# Patient Record
Sex: FEMALE | Race: WHITE | Employment: OTHER | URBAN - METROPOLITAN AREA
[De-identification: names, ages, dates, MRNs, and addresses within clinical notes are randomized per-mention and may not be internally consistent; named-entity substitution may affect disease eponyms.]

---

## 2017-08-03 PROBLEM — G47.33 OSA (OBSTRUCTIVE SLEEP APNEA): Status: ACTIVE | Noted: 2017-08-03

## 2018-03-16 ENCOUNTER — APPOINTMENT (OUTPATIENT)
Dept: CT IMAGING | Age: 68
DRG: 312 | End: 2018-03-16
Payer: MEDICARE

## 2018-03-16 ENCOUNTER — HOSPITAL ENCOUNTER (INPATIENT)
Age: 68
LOS: 1 days | Discharge: HOME | DRG: 312 | End: 2018-03-17
Attending: EMERGENCY MEDICINE | Admitting: INTERNAL MEDICINE
Payer: MEDICARE

## 2018-03-16 PROBLEM — R55 SYNCOPE AND COLLAPSE: Status: ACTIVE | Noted: 2018-03-16

## 2018-03-16 LAB
ALBUMIN SERPL-MCNC: 3.7 G/DL (ref 3.5–5.2)
ALP BLD-CCNC: 21 U/L (ref 35–104)
ALT SERPL-CCNC: 12 U/L (ref 0–32)
ANION GAP SERPL CALCULATED.3IONS-SCNC: 12 MMOL/L (ref 7–16)
AST SERPL-CCNC: 19 U/L (ref 0–31)
BASOPHILS ABSOLUTE: 0.05 E9/L (ref 0–0.2)
BASOPHILS RELATIVE PERCENT: 0.6 % (ref 0–2)
BILIRUB SERPL-MCNC: 0.6 MG/DL (ref 0–1.2)
BUN BLDV-MCNC: 12 MG/DL (ref 8–23)
CALCIUM SERPL-MCNC: 9.2 MG/DL (ref 8.6–10.2)
CHLORIDE BLD-SCNC: 100 MMOL/L (ref 98–107)
CO2: 25 MMOL/L (ref 22–29)
CREAT SERPL-MCNC: 0.8 MG/DL (ref 0.5–1)
EOSINOPHILS ABSOLUTE: 0.1 E9/L (ref 0.05–0.5)
EOSINOPHILS RELATIVE PERCENT: 1.1 % (ref 0–6)
GFR AFRICAN AMERICAN: >60
GFR NON-AFRICAN AMERICAN: >60 ML/MIN/1.73
GLUCOSE BLD-MCNC: 150 MG/DL (ref 74–109)
HCT VFR BLD CALC: 38.1 % (ref 34–48)
HEMOGLOBIN: 12.2 G/DL (ref 11.5–15.5)
IMMATURE GRANULOCYTES #: 0.04 E9/L
IMMATURE GRANULOCYTES %: 0.5 % (ref 0–5)
LYMPHOCYTES ABSOLUTE: 1.29 E9/L (ref 1.5–4)
LYMPHOCYTES RELATIVE PERCENT: 14.6 % (ref 20–42)
MCH RBC QN AUTO: 25.8 PG (ref 26–35)
MCHC RBC AUTO-ENTMCNC: 32 % (ref 32–34.5)
MCV RBC AUTO: 80.5 FL (ref 80–99.9)
MONOCYTES ABSOLUTE: 0.64 E9/L (ref 0.1–0.95)
MONOCYTES RELATIVE PERCENT: 7.2 % (ref 2–12)
NEUTROPHILS ABSOLUTE: 6.74 E9/L (ref 1.8–7.3)
NEUTROPHILS RELATIVE PERCENT: 76 % (ref 43–80)
PDW BLD-RTO: 13.5 FL (ref 11.5–15)
PLATELET # BLD: 230 E9/L (ref 130–450)
PMV BLD AUTO: 10.5 FL (ref 7–12)
POTASSIUM SERPL-SCNC: 4.5 MMOL/L (ref 3.5–5)
RBC # BLD: 4.73 E12/L (ref 3.5–5.5)
SODIUM BLD-SCNC: 137 MMOL/L (ref 132–146)
TOTAL PROTEIN: 6.5 G/DL (ref 6.4–8.3)
TROPONIN: <0.01 NG/ML (ref 0–0.03)
WBC # BLD: 8.9 E9/L (ref 4.5–11.5)

## 2018-03-16 PROCEDURE — 70450 CT HEAD/BRAIN W/O DYE: CPT

## 2018-03-16 PROCEDURE — 72125 CT NECK SPINE W/O DYE: CPT

## 2018-03-16 PROCEDURE — 93005 ELECTROCARDIOGRAM TRACING: CPT | Performed by: PHYSICIAN ASSISTANT

## 2018-03-16 PROCEDURE — 6370000000 HC RX 637 (ALT 250 FOR IP): Performed by: INTERNAL MEDICINE

## 2018-03-16 PROCEDURE — 85025 COMPLETE CBC W/AUTO DIFF WBC: CPT

## 2018-03-16 PROCEDURE — 80053 COMPREHEN METABOLIC PANEL: CPT

## 2018-03-16 PROCEDURE — 6360000002 HC RX W HCPCS: Performed by: INTERNAL MEDICINE

## 2018-03-16 PROCEDURE — 99285 EMERGENCY DEPT VISIT HI MDM: CPT

## 2018-03-16 PROCEDURE — 36415 COLL VENOUS BLD VENIPUNCTURE: CPT

## 2018-03-16 PROCEDURE — 2060000000 HC ICU INTERMEDIATE R&B

## 2018-03-16 PROCEDURE — 84484 ASSAY OF TROPONIN QUANT: CPT

## 2018-03-16 RX ORDER — SODIUM CHLORIDE 0.9 % (FLUSH) 0.9 %
10 SYRINGE (ML) INJECTION EVERY 12 HOURS SCHEDULED
Status: DISCONTINUED | OUTPATIENT
Start: 2018-03-16 | End: 2018-03-16 | Stop reason: SDUPTHER

## 2018-03-16 RX ORDER — ZOLPIDEM TARTRATE 5 MG/1
5 TABLET ORAL NIGHTLY PRN
Status: DISCONTINUED | OUTPATIENT
Start: 2018-03-16 | End: 2018-03-17 | Stop reason: HOSPADM

## 2018-03-16 RX ORDER — ACETAMINOPHEN 325 MG/1
650 TABLET ORAL EVERY 4 HOURS PRN
Status: DISCONTINUED | OUTPATIENT
Start: 2018-03-16 | End: 2018-03-16 | Stop reason: SDUPTHER

## 2018-03-16 RX ORDER — CARVEDILOL 6.25 MG/1
6.25 TABLET ORAL 2 TIMES DAILY WITH MEALS
Status: DISCONTINUED | OUTPATIENT
Start: 2018-03-17 | End: 2018-03-17 | Stop reason: HOSPADM

## 2018-03-16 RX ORDER — LISINOPRIL 5 MG/1
5 TABLET ORAL DAILY
Status: DISCONTINUED | OUTPATIENT
Start: 2018-03-17 | End: 2018-03-17 | Stop reason: HOSPADM

## 2018-03-16 RX ORDER — ASPIRIN 81 MG/1
81 TABLET, CHEWABLE ORAL DAILY
Status: DISCONTINUED | OUTPATIENT
Start: 2018-03-17 | End: 2018-03-17 | Stop reason: HOSPADM

## 2018-03-16 RX ORDER — NITROGLYCERIN 0.4 MG/1
0.4 TABLET SUBLINGUAL EVERY 5 MIN PRN
Status: DISCONTINUED | OUTPATIENT
Start: 2018-03-16 | End: 2018-03-17 | Stop reason: HOSPADM

## 2018-03-16 RX ORDER — EXEMESTANE 25 MG/1
25 TABLET ORAL DAILY
Status: DISCONTINUED | OUTPATIENT
Start: 2018-03-17 | End: 2018-03-17 | Stop reason: HOSPADM

## 2018-03-16 RX ORDER — ONDANSETRON 2 MG/ML
4 INJECTION INTRAMUSCULAR; INTRAVENOUS EVERY 6 HOURS PRN
Status: DISCONTINUED | OUTPATIENT
Start: 2018-03-16 | End: 2018-03-16 | Stop reason: SDUPTHER

## 2018-03-16 RX ORDER — ACETAMINOPHEN 325 MG/1
650 TABLET ORAL EVERY 4 HOURS PRN
Status: DISCONTINUED | OUTPATIENT
Start: 2018-03-16 | End: 2018-03-17 | Stop reason: HOSPADM

## 2018-03-16 RX ORDER — ATORVASTATIN CALCIUM 20 MG/1
20 TABLET, FILM COATED ORAL NIGHTLY
Status: DISCONTINUED | OUTPATIENT
Start: 2018-03-16 | End: 2018-03-17 | Stop reason: HOSPADM

## 2018-03-16 RX ORDER — SODIUM CHLORIDE 0.9 % (FLUSH) 0.9 %
10 SYRINGE (ML) INJECTION EVERY 12 HOURS SCHEDULED
Status: DISCONTINUED | OUTPATIENT
Start: 2018-03-16 | End: 2018-03-17 | Stop reason: HOSPADM

## 2018-03-16 RX ORDER — SODIUM CHLORIDE 0.9 % (FLUSH) 0.9 %
10 SYRINGE (ML) INJECTION PRN
Status: DISCONTINUED | OUTPATIENT
Start: 2018-03-16 | End: 2018-03-17 | Stop reason: HOSPADM

## 2018-03-16 RX ORDER — ONDANSETRON 2 MG/ML
4 INJECTION INTRAMUSCULAR; INTRAVENOUS EVERY 6 HOURS PRN
Status: DISCONTINUED | OUTPATIENT
Start: 2018-03-16 | End: 2018-03-17 | Stop reason: HOSPADM

## 2018-03-16 RX ORDER — SODIUM CHLORIDE 0.9 % (FLUSH) 0.9 %
10 SYRINGE (ML) INJECTION PRN
Status: DISCONTINUED | OUTPATIENT
Start: 2018-03-16 | End: 2018-03-16 | Stop reason: SDUPTHER

## 2018-03-16 RX ADMIN — ENOXAPARIN SODIUM 40 MG: 40 INJECTION SUBCUTANEOUS at 16:49

## 2018-03-16 RX ADMIN — ATORVASTATIN CALCIUM 20 MG: 20 TABLET, FILM COATED ORAL at 22:30

## 2018-03-16 RX ADMIN — ZOLPIDEM TARTRATE 5 MG: 5 TABLET ORAL at 22:30

## 2018-03-16 ASSESSMENT — PAIN SCALES - GENERAL
PAINLEVEL_OUTOF10: 0
PAINLEVEL_OUTOF10: 1

## 2018-03-16 ASSESSMENT — PAIN DESCRIPTION - DESCRIPTORS: DESCRIPTORS: ACHING

## 2018-03-16 ASSESSMENT — PAIN DESCRIPTION - LOCATION: LOCATION: HEAD

## 2018-03-16 ASSESSMENT — PAIN DESCRIPTION - PROGRESSION: CLINICAL_PROGRESSION: GRADUALLY WORSENING

## 2018-03-16 ASSESSMENT — PAIN DESCRIPTION - FREQUENCY: FREQUENCY: INTERMITTENT

## 2018-03-16 ASSESSMENT — PAIN DESCRIPTION - PAIN TYPE: TYPE: ACUTE PAIN

## 2018-03-16 NOTE — ED PROVIDER NOTES
her father and mother; Heart Attack in her father; Heart Disease in her father; Heart Surgery in her sister; High Blood Pressure in her mother; Stroke in her mother. The patients home medications have been reviewed. Allergies: Patient has no known allergies. ---------------------------------------------------PHYSICAL EXAM--------------------------------------    Constitutional/General: Alert and oriented x3, well appearing, non toxic in NAD  Head: Normocephalic, small laceration on occipital area  Eyes: PERRL, EOMI, conjunctive normal, sclera non icteric  Mouth: Oropharynx clear, handling secretions, no trismus, no asymmetry of the posterior oropharynx or uvular edema  Neck: Supple, full ROM, non tender to palpation in the midline, no stridor, no crepitus, no meningeal signs  Respiratory: Lungs clear to auscultation bilaterally, no wheezes, rales, or rhonchi. Not in respiratory distress  Cardiovascular:  Regular rate. Regular rhythm. No murmurs, gallops, or rubs. 2+ distal pulses  Chest: No chest wall tenderness  GI:  Abdomen Soft, Non tender, Non distended. +BS. No organomegaly, no palpable masses,  No rebound, guarding, or rigidity. Musculoskeletal: Moves all extremities x 4. Warm and well perfused, no clubbing, cyanosis, or edema. Capillary refill <3 seconds  Integument: skin warm and dry. No rashes. Lymphatic: no lymphadenopathy noted  Neurologic: GCS 15, no focal deficits, symmetric strength 5/5 in the upper and lower extremities bilaterally, no cerebellar signs, negative Romberg's   Psychiatric: Normal Affect    -------------------------------------------------- RESULTS -------------------------------------------------  I have personally reviewed all laboratory and imaging results for this patient. Results are listed below.      LABS:  Results for orders placed or performed during the hospital encounter of 03/16/18   CBC Auto Differential   Result Value Ref Range    WBC 8.9 4.5 - 11.5 E9/L RBC 4.73 3.50 - 5.50 E12/L    Hemoglobin 12.2 11.5 - 15.5 g/dL    Hematocrit 38.1 34.0 - 48.0 %    MCV 80.5 80.0 - 99.9 fL    MCH 25.8 (L) 26.0 - 35.0 pg    MCHC 32.0 32.0 - 34.5 %    RDW 13.5 11.5 - 15.0 fL    Platelets 726 886 - 880 E9/L    MPV 10.5 7.0 - 12.0 fL    Neutrophils % 76.0 43.0 - 80.0 %    Immature Granulocytes % 0.5 0.0 - 5.0 %    Lymphocytes % 14.6 (L) 20.0 - 42.0 %    Monocytes % 7.2 2.0 - 12.0 %    Eosinophils % 1.1 0.0 - 6.0 %    Basophils % 0.6 0.0 - 2.0 %    Neutrophils # 6.74 1.80 - 7.30 E9/L    Immature Granulocytes # 0.04 E9/L    Lymphocytes # 1.29 (L) 1.50 - 4.00 E9/L    Monocytes # 0.64 0.10 - 0.95 E9/L    Eosinophils # 0.10 0.05 - 0.50 E9/L    Basophils # 0.05 0.00 - 0.20 E9/L   Comprehensive Metabolic Panel   Result Value Ref Range    Sodium 137 132 - 146 mmol/L    Potassium 4.5 3.5 - 5.0 mmol/L    Chloride 100 98 - 107 mmol/L    CO2 25 22 - 29 mmol/L    Anion Gap 12 7 - 16 mmol/L    Glucose 150 (H) 74 - 109 mg/dL    BUN 12 8 - 23 mg/dL    CREATININE 0.8 0.5 - 1.0 mg/dL    GFR Non-African American >60 >=60 mL/min/1.73    GFR African American >60     Calcium 9.2 8.6 - 10.2 mg/dL    Total Protein 6.5 6.4 - 8.3 g/dL    Alb 3.7 3.5 - 5.2 g/dL    Total Bilirubin 0.6 0.0 - 1.2 mg/dL    Alkaline Phosphatase 21 (L) 35 - 104 U/L    ALT 12 0 - 32 U/L    AST 19 0 - 31 U/L   Troponin   Result Value Ref Range    Troponin <0.01 0.00 - 0.03 ng/mL       RADIOLOGY:  Interpreted by Radiologist.  CT Head WO Contrast    (Results Pending)   CT Cervical Spine WO Contrast    (Results Pending)       EKG: This EKG is signed and interpreted by the EP. Time: 13:29  Rate: 64  Rhythm: Sinus  Interpretation: non-specific EKG  Comparison: stable as compared to patient's most recent EKG and None      ------------------------- NURSING NOTES AND VITALS REVIEWED ---------------------------   The nursing notes within the ED encounter and vital signs as below have been reviewed by myself.   BP (!) 155/70   Pulse 70

## 2018-03-16 NOTE — ED NOTES
Chacorta Soto on the floor states he can see the pt on the monitor     Margrett Hamman, RN  03/16/18 1800

## 2018-03-16 NOTE — ED NOTES
FIRST  PROVIDER CONTACT ASSESSMENT NOTE      Department of Emergency Medicine   3/16/18  1:23 PM    Chief Complaint: Loss of Consciousness (states she passed out and hit head on a chair fell from standing )      History of Present Illness:    Virginia Goldstein is a 74 y.o. female who presents to the ED by private car for LOC that occurred at 11am.  fell and hit her head on a chair. States he took all of her medications and did not eat. States feels improved now. Focused Screening Exam:  Constitutional:  Alert, appears stated age and is in no distress. Neuro: No focal deficits  Head: 1.0 cm scalp laceration. *ALLERGIES*     Patient has no known allergies.      ED Triage Vitals   BP Temp Temp src Pulse Resp SpO2 Height Weight   -- -- -- -- -- -- -- --        Initial Plan of Care:  Initiate Treatment-Testing, Proceed toTreatment Area When Bed Available for ED Attending/MLP to Continue Care    -----------------END OF FIRST PROVIDER CONTACT ASSESSMENT NOTE--------------  Electronically signed by KIMBERLEY Hernandez   DD: 3/16/18       KIMBERLEY Hernandez  03/16/18 [id number]

## 2018-03-17 VITALS
BODY MASS INDEX: 29.26 KG/M2 | RESPIRATION RATE: 16 BRPM | TEMPERATURE: 98.3 F | HEIGHT: 62 IN | SYSTOLIC BLOOD PRESSURE: 132 MMHG | OXYGEN SATURATION: 98 % | HEART RATE: 72 BPM | WEIGHT: 159 LBS | DIASTOLIC BLOOD PRESSURE: 76 MMHG

## 2018-03-17 LAB
ANION GAP SERPL CALCULATED.3IONS-SCNC: 16 MMOL/L (ref 7–16)
BASOPHILS ABSOLUTE: 0.04 E9/L (ref 0–0.2)
BASOPHILS RELATIVE PERCENT: 0.5 % (ref 0–2)
BUN BLDV-MCNC: 14 MG/DL (ref 8–23)
CALCIUM SERPL-MCNC: 8.9 MG/DL (ref 8.6–10.2)
CHLORIDE BLD-SCNC: 105 MMOL/L (ref 98–107)
CO2: 22 MMOL/L (ref 22–29)
CREAT SERPL-MCNC: 0.9 MG/DL (ref 0.5–1)
EOSINOPHILS ABSOLUTE: 0.15 E9/L (ref 0.05–0.5)
EOSINOPHILS RELATIVE PERCENT: 2 % (ref 0–6)
GFR AFRICAN AMERICAN: >60
GFR NON-AFRICAN AMERICAN: >60 ML/MIN/1.73
GLUCOSE BLD-MCNC: 101 MG/DL (ref 74–109)
HCT VFR BLD CALC: 35 % (ref 34–48)
HEMOGLOBIN: 11.4 G/DL (ref 11.5–15.5)
IMMATURE GRANULOCYTES #: 0.02 E9/L
IMMATURE GRANULOCYTES %: 0.3 % (ref 0–5)
LYMPHOCYTES ABSOLUTE: 1.88 E9/L (ref 1.5–4)
LYMPHOCYTES RELATIVE PERCENT: 25.3 % (ref 20–42)
MAGNESIUM: 2 MG/DL (ref 1.6–2.6)
MCH RBC QN AUTO: 25.6 PG (ref 26–35)
MCHC RBC AUTO-ENTMCNC: 32.6 % (ref 32–34.5)
MCV RBC AUTO: 78.7 FL (ref 80–99.9)
MONOCYTES ABSOLUTE: 0.89 E9/L (ref 0.1–0.95)
MONOCYTES RELATIVE PERCENT: 12 % (ref 2–12)
NEUTROPHILS ABSOLUTE: 4.44 E9/L (ref 1.8–7.3)
NEUTROPHILS RELATIVE PERCENT: 59.9 % (ref 43–80)
PDW BLD-RTO: 13.6 FL (ref 11.5–15)
PLATELET # BLD: 213 E9/L (ref 130–450)
PMV BLD AUTO: 10.7 FL (ref 7–12)
POTASSIUM REFLEX MAGNESIUM: 3.5 MMOL/L (ref 3.5–5)
RBC # BLD: 4.45 E12/L (ref 3.5–5.5)
SODIUM BLD-SCNC: 143 MMOL/L (ref 132–146)
WBC # BLD: 7.4 E9/L (ref 4.5–11.5)

## 2018-03-17 PROCEDURE — 85025 COMPLETE CBC W/AUTO DIFF WBC: CPT

## 2018-03-17 PROCEDURE — 6370000000 HC RX 637 (ALT 250 FOR IP): Performed by: INTERNAL MEDICINE

## 2018-03-17 PROCEDURE — 2580000003 HC RX 258

## 2018-03-17 PROCEDURE — 80048 BASIC METABOLIC PNL TOTAL CA: CPT

## 2018-03-17 PROCEDURE — 36415 COLL VENOUS BLD VENIPUNCTURE: CPT

## 2018-03-17 PROCEDURE — 83735 ASSAY OF MAGNESIUM: CPT

## 2018-03-17 RX ADMIN — CARVEDILOL 6.25 MG: 6.25 TABLET, FILM COATED ORAL at 09:07

## 2018-03-17 RX ADMIN — LISINOPRIL 5 MG: 5 TABLET ORAL at 09:07

## 2018-03-17 RX ADMIN — ASPIRIN 81 MG 81 MG: 81 TABLET ORAL at 09:07

## 2018-03-17 NOTE — H&P
sputum production,   Denies chest pain, pressure, orthopnea, palpitations,   Denies abd pain, N/V/D/C/melena, hematochezia,   Denies urinary frequency, urgency, dysuria, hematuria,   Denies any acute muscle aches, paresthesias, weakness, seizure,  Denies depression, anxiety.   OBJECTIVE  Vitals:    03/17/18 0136   BP: (!) 154/71   Pulse: 75   Resp: 16   Temp: 98.3 °F (36.8 °C)   SpO2:      Gen: AO3, NAD  Head: AT/NC, PERRL, EOMIx2, no icterus, conjunctival injection  Neck: No JVD, carotid bruits, LAD, thyroid non-palpable  Heart: RRR with no murmurs, rubs, gallops  Lungs: CTA B/L, no W/R/R  Abd: soft, NT, ND, BS+, no G/R, no HSM  Ext: No C/C/E, pulses intact distally B/L  Neuro: CN 2-12 grossly intact with no focal deficits    ASSESSMENT  Syncopal episode   Laceration to scalp   PLAN    Cardiology consult

## 2018-03-17 NOTE — CONSULTS
Consults   Dictated  Neurocardiogenic syncope  No tests planned  Eat and ambulate, home this am if no issue
age.    REVIEW OF SYSTEMS:  CONSTITUTIONAL:  Denies fevers, chills, or night  sweats. HEENT:  Denies headache, nosebleeds, or blurred vision; oral pain,  abscess, or lesion. MUSCULOSKELETAL:  Denies falls, joint swelling or  pain, restricted motion, musculoskeletal pain. CARDIOVASCULAR:  See HPI. RESPIRATORY:  No cough, shortness of breath, or wheezing. GASTROINTESTINAL:  No abdominal pain, heartburn, or bloody stool. GENITOURINARY:  No frequency in urination or urgency. SKIN:  No rashes,  blisters, sores, or growth. NEUROLOGICAL:  No numbness or tingling  sensation. No sensation loss. No seizures. PSYCHIATRIC:  No nervousness,  anxiety, or depression. ENDOCRINE:  No heat or cold intolerance or  excessive thirst.  HEMATOLOGICAL/LYMPHATIC:  No bleeding. PHYSICAL EXAMINATION:  GENERAL:  Reveals a pleasant elderly female in no distress. VITAL SIGNS:  Blood pressure is 154/71, heart rate 75 and regular,  respiratory rate 16. HEENT:  Normocephalic, atraumatic. Conjunctivae pink. The oral mucosa is  moist.  There is no stridor. Trachea is midline. NECK:  There is no jugular venous distention. No carotid bruits. RESPIRATORY:  The lungs are clear. There are no rales or wheeze. CARDIOVASCULAR:  The apical impulse is nondisplaced. Normal S1, single S2. No obvious murmur, gallop, rub, or click detected  ABDOMEN:  Soft and nontender to palpation. Bowel sounds present. No  palpable masses. MUSCULOSKELETAL:  Good muscle strength and tone. No atrophy or abnormal  movement. SKIN:  Warm and dry. No stasis dermatitis or ulcers. NEUROLOGIC/PSYCHIATRIC:  Oriented to person, place, and time. Speech clear  and appropriate. EKG is mostly normal sinus rhythm, nonspecific T wave change, no change  from last tracing. LABORATORY DATA:  Troponin negative x1, potassium 4.5, GFR greater than 60,  hemoglobin 12.2. Monitor strip, no arrhythmias.     ASSESSMENT/PLAN:  The patient presents with symptoms

## 2018-04-07 LAB
EKG ATRIAL RATE: 64 BPM
EKG P AXIS: 27 DEGREES
EKG P-R INTERVAL: 150 MS
EKG Q-T INTERVAL: 410 MS
EKG QRS DURATION: 88 MS
EKG QTC CALCULATION (BAZETT): 422 MS
EKG R AXIS: 52 DEGREES
EKG T AXIS: 32 DEGREES
EKG VENTRICULAR RATE: 64 BPM

## 2018-05-01 PROBLEM — F41.9 ANXIETY: Status: ACTIVE | Noted: 2018-05-01

## 2018-10-02 ENCOUNTER — HOSPITAL ENCOUNTER (OUTPATIENT)
Dept: NUCLEAR MEDICINE | Age: 68
Discharge: HOME | End: 2018-10-04
Payer: MEDICARE

## 2018-10-02 ENCOUNTER — HOSPITAL ENCOUNTER (OUTPATIENT)
Dept: GENERAL RADIOLOGY | Age: 68
Discharge: HOME | End: 2018-10-04
Payer: MEDICARE

## 2018-10-02 DIAGNOSIS — C50.511 MALIGNANT NEOPLASM OF LOWER-OUTER QUADRANT OF RIGHT FEMALE BREAST, UNSPECIFIED ESTROGEN RECEPTOR STATUS (HCC): ICD-10-CM

## 2018-10-02 DIAGNOSIS — C50.911 MALIGNANT NEOPLASM OF RIGHT FEMALE BREAST, UNSPECIFIED ESTROGEN RECEPTOR STATUS, UNSPECIFIED SITE OF BREAST (HCC): ICD-10-CM

## 2018-10-02 PROCEDURE — A9503 TC99M MEDRONATE: HCPCS | Performed by: RADIOLOGY

## 2018-10-02 PROCEDURE — 3430000000 HC RX DIAGNOSTIC RADIOPHARMACEUTICAL: Performed by: RADIOLOGY

## 2018-10-02 PROCEDURE — 71046 X-RAY EXAM CHEST 2 VIEWS: CPT

## 2018-10-02 PROCEDURE — 78306 BONE IMAGING WHOLE BODY: CPT

## 2018-10-02 RX ORDER — TC 99M MEDRONATE 20 MG/10ML
25 INJECTION, POWDER, LYOPHILIZED, FOR SOLUTION INTRAVENOUS
Status: COMPLETED | OUTPATIENT
Start: 2018-10-02 | End: 2018-10-02

## 2018-10-02 RX ADMIN — Medication 25 MILLICURIE: at 08:11

## 2018-11-08 ENCOUNTER — HOSPITAL ENCOUNTER (OUTPATIENT)
Age: 68
Discharge: HOME | End: 2018-11-10
Payer: MEDICARE

## 2018-11-08 LAB
ALBUMIN SERPL-MCNC: 3.6 G/DL (ref 3.5–5.2)
ALP BLD-CCNC: 30 U/L (ref 35–104)
ALT SERPL-CCNC: 10 U/L (ref 0–32)
ANION GAP SERPL CALCULATED.3IONS-SCNC: 13 MMOL/L (ref 7–16)
AST SERPL-CCNC: 16 U/L (ref 0–31)
BILIRUB SERPL-MCNC: 0.3 MG/DL (ref 0–1.2)
BUN BLDV-MCNC: 12 MG/DL (ref 8–23)
CALCIUM SERPL-MCNC: 9.1 MG/DL (ref 8.6–10.2)
CHLORIDE BLD-SCNC: 101 MMOL/L (ref 98–107)
CHOLESTEROL, TOTAL: 230 MG/DL (ref 0–199)
CO2: 24 MMOL/L (ref 22–29)
CREAT SERPL-MCNC: 0.7 MG/DL (ref 0.5–1)
GFR AFRICAN AMERICAN: >60
GFR NON-AFRICAN AMERICAN: >60 ML/MIN/1.73
GLUCOSE BLD-MCNC: 81 MG/DL (ref 74–99)
LACTATE DEHYDROGENASE: 222 U/L (ref 135–214)
POTASSIUM SERPL-SCNC: 5 MMOL/L (ref 3.5–5)
SODIUM BLD-SCNC: 138 MMOL/L (ref 132–146)
TOTAL PROTEIN: 6.7 G/DL (ref 6.4–8.3)
TRIGL SERPL-MCNC: 143 MG/DL (ref 0–149)
URIC ACID, SERUM: 3.9 MG/DL (ref 2.4–5.7)

## 2018-11-08 PROCEDURE — 82465 ASSAY BLD/SERUM CHOLESTEROL: CPT

## 2018-11-08 PROCEDURE — 80053 COMPREHEN METABOLIC PANEL: CPT

## 2018-11-08 PROCEDURE — 83615 LACTATE (LD) (LDH) ENZYME: CPT

## 2018-11-08 PROCEDURE — 84478 ASSAY OF TRIGLYCERIDES: CPT

## 2018-11-08 PROCEDURE — 84550 ASSAY OF BLOOD/URIC ACID: CPT

## 2020-01-01 ENCOUNTER — APPOINTMENT (OUTPATIENT)
Dept: GENERAL RADIOLOGY | Age: 70
End: 2020-01-01
Payer: MEDICARE

## 2020-01-01 ENCOUNTER — HOSPITAL ENCOUNTER (EMERGENCY)
Age: 70
End: 2020-07-15
Attending: EMERGENCY MEDICINE
Payer: MEDICARE

## 2020-01-01 VITALS
RESPIRATION RATE: 27 BRPM | OXYGEN SATURATION: 100 % | TEMPERATURE: 97.3 F | BODY MASS INDEX: 32.92 KG/M2 | DIASTOLIC BLOOD PRESSURE: 35 MMHG | HEART RATE: 44 BPM | WEIGHT: 180 LBS | SYSTOLIC BLOOD PRESSURE: 66 MMHG

## 2020-01-01 LAB
AADO2: 253.9 MMHG
ALBUMIN SERPL-MCNC: 1.9 G/DL (ref 3.5–5.2)
ALP BLD-CCNC: 22 U/L (ref 35–104)
ALT SERPL-CCNC: 377 U/L (ref 0–32)
ANION GAP SERPL CALCULATED.3IONS-SCNC: 29 MMOL/L (ref 7–16)
ANISOCYTOSIS: ABNORMAL
APTT: 36.1 SEC (ref 24.5–35.1)
AST SERPL-CCNC: 770 U/L (ref 0–31)
B.E.: -19.6 MMOL/L (ref -3–3)
BASOPHILS ABSOLUTE: 0 E9/L (ref 0–0.2)
BASOPHILS RELATIVE PERCENT: 0.3 % (ref 0–2)
BILIRUB SERPL-MCNC: <0.2 MG/DL (ref 0–1.2)
BUN BLDV-MCNC: 11 MG/DL (ref 8–23)
BURR CELLS: ABNORMAL
CALCIUM SERPL-MCNC: 7.8 MG/DL (ref 8.6–10.2)
CHLORIDE BLD-SCNC: 103 MMOL/L (ref 98–107)
CO2: 13 MMOL/L (ref 22–29)
COHB: 0.3 % (ref 0–1.5)
CREAT SERPL-MCNC: 1 MG/DL (ref 0.5–1)
CRITICAL: ABNORMAL
DATE ANALYZED: ABNORMAL
DATE OF COLLECTION: ABNORMAL
EOSINOPHILS ABSOLUTE: 0 E9/L (ref 0.05–0.5)
EOSINOPHILS RELATIVE PERCENT: 1.1 % (ref 0–6)
FIO2: 100 %
GFR AFRICAN AMERICAN: >60
GFR NON-AFRICAN AMERICAN: 55 ML/MIN/1.73
GLUCOSE BLD-MCNC: 367 MG/DL (ref 74–99)
HCO3: 7.9 MMOL/L (ref 22–26)
HCT VFR BLD CALC: 31.3 % (ref 34–48)
HEMOGLOBIN: 8.7 G/DL (ref 11.5–15.5)
HHB: 1.1 % (ref 0–5)
INR BLD: 1
LAB: ABNORMAL
LACTIC ACID: 16.9 MMOL/L (ref 0.5–2.2)
LYMPHOCYTES ABSOLUTE: 4.61 E9/L (ref 1.5–4)
LYMPHOCYTES RELATIVE PERCENT: 49.1 % (ref 20–42)
Lab: ABNORMAL
MAGNESIUM: 4.6 MG/DL (ref 1.6–2.6)
MCH RBC QN AUTO: 25.1 PG (ref 26–35)
MCHC RBC AUTO-ENTMCNC: 27.8 % (ref 32–34.5)
MCV RBC AUTO: 90.5 FL (ref 80–99.9)
METAMYELOCYTES RELATIVE PERCENT: 8.8 % (ref 0–1)
METHB: 0.2 % (ref 0–1.5)
MODE: AC
MONOCYTES ABSOLUTE: 0.38 E9/L (ref 0.1–0.95)
MONOCYTES RELATIVE PERCENT: 3.5 % (ref 2–12)
MYELOCYTE PERCENT: 2.6 % (ref 0–0)
NEUTROPHILS ABSOLUTE: 4.42 E9/L (ref 1.8–7.3)
NEUTROPHILS RELATIVE PERCENT: 36 % (ref 43–80)
NUCLEATED RED BLOOD CELLS: 2.6 /100 WBC
O2 SATURATION: 99.7 % (ref 92–98.5)
O2HB: 98.4 % (ref 94–97)
OPERATOR ID: 421
OVALOCYTES: ABNORMAL
PATIENT TEMP: 37 C
PCO2: 24.5 MMHG (ref 35–45)
PDW BLD-RTO: 16.7 FL (ref 11.5–15)
PEEP/CPAP: 5 CMH2O
PFO2: 4.1 MMHG/%
PH BLOOD GAS: 7.13 (ref 7.35–7.45)
PHOSPHORUS: 8 MG/DL (ref 2.5–4.5)
PLATELET # BLD: 213 E9/L (ref 130–450)
PMV BLD AUTO: 10.7 FL (ref 7–12)
PO2: 409.6 MMHG (ref 75–100)
POIKILOCYTES: ABNORMAL
POTASSIUM REFLEX MAGNESIUM: 3.9 MMOL/L (ref 3.5–5)
PRO-BNP: 5730 PG/ML (ref 0–125)
PROTHROMBIN TIME: 11.7 SEC (ref 9.3–12.4)
RBC # BLD: 3.46 E12/L (ref 3.5–5.5)
RI(T): 0.62
RR MECHANICAL: 16 B/MIN
SODIUM BLD-SCNC: 145 MMOL/L (ref 132–146)
SOURCE, BLOOD GAS: ABNORMAL
THB: 9.7 G/DL (ref 11.5–16.5)
TIME ANALYZED: 1729
TOTAL PROTEIN: 3.6 G/DL (ref 6.4–8.3)
TROPONIN: 0.11 NG/ML (ref 0–0.03)
VT MECHANICAL: 450 ML
WBC # BLD: 9.4 E9/L (ref 4.5–11.5)

## 2020-01-01 PROCEDURE — 83605 ASSAY OF LACTIC ACID: CPT

## 2020-01-01 PROCEDURE — 93005 ELECTROCARDIOGRAM TRACING: CPT | Performed by: EMERGENCY MEDICINE

## 2020-01-01 PROCEDURE — 84100 ASSAY OF PHOSPHORUS: CPT

## 2020-01-01 PROCEDURE — 82805 BLOOD GASES W/O2 SATURATION: CPT

## 2020-01-01 PROCEDURE — 99291 CRITICAL CARE FIRST HOUR: CPT | Performed by: INTERNAL MEDICINE

## 2020-01-01 PROCEDURE — 83880 ASSAY OF NATRIURETIC PEPTIDE: CPT

## 2020-01-01 PROCEDURE — 96375 TX/PRO/DX INJ NEW DRUG ADDON: CPT

## 2020-01-01 PROCEDURE — 6360000002 HC RX W HCPCS: Performed by: EMERGENCY MEDICINE

## 2020-01-01 PROCEDURE — 96374 THER/PROPH/DIAG INJ IV PUSH: CPT

## 2020-01-01 PROCEDURE — 80053 COMPREHEN METABOLIC PANEL: CPT

## 2020-01-01 PROCEDURE — 93010 ELECTROCARDIOGRAM REPORT: CPT | Performed by: INTERNAL MEDICINE

## 2020-01-01 PROCEDURE — 83735 ASSAY OF MAGNESIUM: CPT

## 2020-01-01 PROCEDURE — 85730 THROMBOPLASTIN TIME PARTIAL: CPT

## 2020-01-01 PROCEDURE — 71045 X-RAY EXAM CHEST 1 VIEW: CPT

## 2020-01-01 PROCEDURE — 2580000003 HC RX 258: Performed by: EMERGENCY MEDICINE

## 2020-01-01 PROCEDURE — 84484 ASSAY OF TROPONIN QUANT: CPT

## 2020-01-01 PROCEDURE — 99285 EMERGENCY DEPT VISIT HI MDM: CPT

## 2020-01-01 PROCEDURE — 2500000003 HC RX 250 WO HCPCS: Performed by: EMERGENCY MEDICINE

## 2020-01-01 PROCEDURE — 85025 COMPLETE CBC W/AUTO DIFF WBC: CPT

## 2020-01-01 PROCEDURE — 92950 HEART/LUNG RESUSCITATION CPR: CPT

## 2020-01-01 PROCEDURE — 31500 INSERT EMERGENCY AIRWAY: CPT

## 2020-01-01 PROCEDURE — 85610 PROTHROMBIN TIME: CPT

## 2020-01-01 RX ORDER — DOBUTAMINE HYDROCHLORIDE 400 MG/100ML
5 INJECTION INTRAVENOUS CONTINUOUS
Status: DISCONTINUED | OUTPATIENT
Start: 2020-01-01 | End: 2020-07-16 | Stop reason: HOSPADM

## 2020-01-01 RX ORDER — MAGNESIUM SULFATE HEPTAHYDRATE 500 MG/ML
INJECTION, SOLUTION INTRAMUSCULAR; INTRAVENOUS DAILY PRN
Status: COMPLETED | OUTPATIENT
Start: 2020-01-01 | End: 2020-01-01

## 2020-01-01 RX ORDER — ATROPINE SULFATE 0.1 MG/ML
INJECTION INTRAVENOUS DAILY PRN
Status: COMPLETED | OUTPATIENT
Start: 2020-01-01 | End: 2020-01-01

## 2020-01-01 RX ORDER — LIDOCAINE HYDROCHLORIDE 20 MG/ML
INJECTION, SOLUTION INTRAVENOUS DAILY PRN
Status: COMPLETED | OUTPATIENT
Start: 2020-01-01 | End: 2020-01-01

## 2020-01-01 RX ORDER — ESMOLOL HYDROCHLORIDE 10 MG/ML
50 INJECTION INTRAVENOUS ONCE
Status: COMPLETED | OUTPATIENT
Start: 2020-01-01 | End: 2020-01-01

## 2020-01-01 RX ORDER — EPINEPHRINE 0.1 MG/ML
SYRINGE (ML) INJECTION DAILY PRN
Status: COMPLETED | OUTPATIENT
Start: 2020-01-01 | End: 2020-01-01

## 2020-01-01 RX ORDER — AMIODARONE HYDROCHLORIDE 50 MG/ML
INJECTION, SOLUTION INTRAVENOUS DAILY PRN
Status: COMPLETED | OUTPATIENT
Start: 2020-01-01 | End: 2020-01-01

## 2020-01-01 RX ADMIN — DOBUTAMINE HYDROCHLORIDE 5 MCG/KG/MIN: 400 INJECTION INTRAVENOUS at 17:40

## 2020-01-01 RX ADMIN — Medication 1 MG: at 15:46

## 2020-01-01 RX ADMIN — Medication 1 MG: at 15:49

## 2020-01-01 RX ADMIN — Medication 1 MG: at 15:56

## 2020-01-01 RX ADMIN — SODIUM BICARBONATE 50 MEQ: 84 INJECTION, SOLUTION INTRAVENOUS at 16:03

## 2020-01-01 RX ADMIN — ESMOLOL HYDROCHLORIDE 50 MG: 10 INJECTION, SOLUTION INTRAVENOUS at 16:00

## 2020-01-01 RX ADMIN — Medication 1 MG: at 15:52

## 2020-01-01 RX ADMIN — SODIUM BICARBONATE 50 MEQ: 84 INJECTION, SOLUTION INTRAVENOUS at 15:57

## 2020-01-01 RX ADMIN — AMIODARONE HYDROCHLORIDE 300 MG: 50 INJECTION, SOLUTION INTRAVENOUS at 15:47

## 2020-01-01 RX ADMIN — EPINEPHRINE 20 MCG/MIN: 1 INJECTION PARENTERAL at 17:14

## 2020-01-01 RX ADMIN — LIDOCAINE HYDROCHLORIDE 100 MG: 20 INJECTION, SOLUTION INTRAVENOUS at 16:03

## 2020-01-01 RX ADMIN — Medication 1 MG: at 16:02

## 2020-01-01 RX ADMIN — SODIUM BICARBONATE 50 MEQ: 84 INJECTION, SOLUTION INTRAVENOUS at 15:54

## 2020-01-01 RX ADMIN — LIDOCAINE HYDROCHLORIDE 100 MG: 20 INJECTION, SOLUTION INTRAVENOUS at 15:52

## 2020-01-01 RX ADMIN — MAGNESIUM SULFATE HEPTAHYDRATE 2 G: 500 INJECTION, SOLUTION INTRAMUSCULAR; INTRAVENOUS at 15:57

## 2020-01-01 RX ADMIN — AMIODARONE HYDROCHLORIDE 150 MG: 50 INJECTION, SOLUTION INTRAVENOUS at 15:56

## 2020-01-01 RX ADMIN — ATROPINE SULFATE 1 MG: 0.1 INJECTION, SOLUTION ENDOTRACHEAL; INTRAMUSCULAR; INTRAVENOUS; SUBCUTANEOUS at 16:04

## 2020-01-01 RX ADMIN — MAGNESIUM SULFATE HEPTAHYDRATE 2 G: 500 INJECTION, SOLUTION INTRAMUSCULAR; INTRAVENOUS at 16:03

## 2020-01-01 RX ADMIN — Medication 50 MCG/MIN: at 17:15

## 2020-01-01 RX ADMIN — Medication 1 MG: at 15:59

## 2020-07-15 NOTE — ED NOTES
Pt talking to RN and went unresponsive, physician called to room, patient found to be pulseless and CPR started immediately.       Catina Bowers RN  07/15/20 [id number]

## 2020-07-15 NOTE — ED NOTES
Levophed increased to 50 mcg/min, U/S heart EF less than 10 per cardiology      Zhanna Tamayo, RN  07/15/20 [id number]

## 2020-07-15 NOTE — PROCEDURES
Arterial line placement    Procedure: Right femoral arterial line placement. Indications: Continuous monitoring of blood pressure in a patient with hypotension +/- shock, on Levophed. Anesthesia: Local infiltration of 1% lidocaine. Consent:  Unable to be obtained due to the emergent nature of this procedure. Technique: Time Out: Immediately prior to the procedure a \"timeout\" was called to verify the correct patient and procedure. Procedure was done using strict aseptic technique. Right femoral site was cleaned with chloraprep and draped. Femoral artery was identified, then Lidocaine 1% was infiltrated locally. Femoral arterial line was inserted, a good blood flow was obtained, after which guidewire was inserted all the way with no resistance. Then the canula was inserted and needle with guidewire was withdrawn. Pulsatile bright red blood flow was observed. The canula was connected to BP monitoring apparatus and a good quality waveform was noted. Then the canula was secured with 2 stay sutures of 3-0 silk after Lidocaine infiltration, following which dressing was applied. Number of sticks: 2. Number of Kits used: 1. Complications: No immediate complication. Estimated blood loss: About 5 ml. Comment: Patient tolerated the procedure well.      Azam Blum MD PGY-2  7/15/2020 5:29 PM    Attending Dr Ashok Glover

## 2020-07-15 NOTE — CONSULTS
Cardiology critical care consult note    Reason for consultation: Inferior STEMI, cardiac arrest, cardiogenic shock. History of chief complaint: This is a 17-year-old woman followed in our group by Dr. Gaby Ames. She does have a history of coronary artery disease with a previous stent to the RCA and LAD. She is currently unresponsive with no neurologic function. 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  EMS brought her to the hospital.  She was found to be in complete heart block. She then had a cardiac arrest.  Emergency room physician states that they coded her for well over 20 minutes. They did then have return of spontaneous circulation. She was in complete heart block. There externally pacing her and she was on Levophed. Upon my arrival, her ECG demonstrates an inferior STEMI with complete heart block. On telemetry she is 100% paced. They are not able to feel pulses even in the carotid. Her eyes are open she is staring straight ahead. She has no neurologic function. Her pupils are fixed and dilated. She is not being sedated and she is on the ventilator. She is not breathing over the ventilator. I used the ultrasound machine at the bedside to perform a limited echocardiogram.  The inferior and posterior walls are dyskinetic. The anterior and septum are slightly twitching. The ejection fraction is much less than 10%. The Levophed has been maxed. We still have no pulse. She still has no neurologic function and still not being sedated. We are going to start epinephrine. The emergency room physician is going to discuss with the family as soon as they can be reached. I discussed with the emergency room physician. At this time she does not appear to be a candidate for the Cath Lab. She does not have any neurologic function.   Continue supportive care as above and consider stopping resuscitative measures based on discussion with family. Also discussed with emergency room physician and resident. He was about to attempt to put in a central line in the femoral vein. I recommended placing a right femoral arterial line to monitor hemodynamics and to check an ABG. I discussed with him and he will send off an ABG as soon as he gets arterial access. There are no labs including ABG at this time. 32 minutes critical care.

## 2020-07-15 NOTE — ED PROVIDER NOTES
Chief complaint: Dizziness      HPI:  7/15/20, Time: 4:23 PM EDT         Matty Ortiz is a 81 y.o. female presenting to the ED for dizziness. History is obtained from EMS report as well as triage nurse. I was called to the room for patient becoming unresponsive that she was being triaged. I did arrive to the room and found the patient to be a cardiac arrest.  ACLS was initiated please see code documentation. History is otherwise limited secondary to the patient being a cardiac arrest.    ROS:   Review of Systems   Neurological: Positive for dizziness. Unable to be obtained secondary to the patient having cardiac arrest.  --------------------------------------------- PAST HISTORY ---------------------------------------------  Past Medical History:  has a past medical history of Anxiety, CAD (coronary artery disease), Cancer ([facility] CHB (complete heart block) ([facility] Coronary artery disease involving native coronary artery of native heart without angina pectoris, Depression, GERD (gastroesophageal reflux disease), Hyperlipidemia, Hyperlipidemia LDL goal <70, and Hypertension. Past Surgical History:  has a past surgical history that includes Breast surgery (2001); Coronary angioplasty with stent (2/15/16); Coronary angioplasty with stent (3/24/2016); Diagnostic Cardiac Cath Lab Procedure; Coronary angioplasty; and bronchoscopy (5/18/2016). Social History:  reports that she quit smoking about 37 years ago. She started smoking about 49 years ago. She has a 10.00 pack-year smoking history. She has never used smokeless tobacco. She reports current alcohol use. She reports that she does not use drugs.     Family History: family history includes Arthritis in her maternal grandmother and mother; Cancer in her maternal aunt and mother; Hearing Loss in her father and mother; Heart Attack in her father; Heart Disease in her father; Heart Surgery in her sister; High Blood Pressure in her mother; Stroke in her mother. The patients home medications have been reviewed. Allergies: Patient has no known allergies. ---------------------------------------------------PHYSICAL EXAM--------------------------------------    Constitutional/General: Unresponsive, cyanotic  Head: Normocephalic and atraumatic  Eyes: Fixed and dilated  Mouth: Oropharynx clear  Neck: Supple,   Pulmonary: Clear to auscultation bilaterally, assisted ventilation, no rales, rhonchi or wheezing  Cardiovascular: Bradycardic rate. Regular rhythm. No murmurs  Chest: no chest wall tenderness  Abdomen: Soft. Nondistended  Musculoskeletal: Warm and well perfused, no gross evidence of trauma  Skin: warm and dry. Neurologic: GCS 3-pupils fixed and dilated  Psych: Unable to be evaluated secondary to the patient being a cardiac arrest    -------------------------------------------------- RESULTS -------------------------------------------------  I have personally reviewed all laboratory and imaging results for this patient. Results are listed below.      LABS:  Results for orders placed or performed during the hospital encounter of 07/15/20   CBC Auto Differential   Result Value Ref Range    WBC 9.4 4.5 - 11.5 E9/L    RBC 3.46 (L) 3.50 - 5.50 E12/L    Hemoglobin 8.7 (L) 11.5 - 15.5 g/dL    Hematocrit 31.3 (L) 34.0 - 48.0 %    MCV 90.5 80.0 - 99.9 fL    MCH 25.1 (L) 26.0 - 35.0 pg    MCHC 27.8 (L) 32.0 - 34.5 %    RDW 16.7 (H) 11.5 - 15.0 fL    Platelets 920 407 - 438 E9/L    MPV 10.7 7.0 - 12.0 fL    Neutrophils % 36.0 (L) 43.0 - 80.0 %    Lymphocytes % 49.1 (H) 20.0 - 42.0 %    Monocytes % 3.5 2.0 - 12.0 %    Eosinophils % 1.1 0.0 - 6.0 %    Basophils % 0.3 0.0 - 2.0 %    Neutrophils Absolute 4.42 1.80 - 7.30 E9/L    Lymphocytes Absolute 4.61 (H) 1.50 - 4.00 E9/L    Monocytes Absolute 0.38 0.10 - 0.95 E9/L    Eosinophils Absolute 0.00 (L) 0.05 - 0.50 E9/L    Basophils Absolute 0.00 0.00 - 0.20 E9/L    Metamyelocytes Relative 8.8 (H) 0.0 - 1.0 % Myelocyte Percent 2.6 0 - 0 %    nRBC 2.6 /100 WBC    Anisocytosis 1+     Poikilocytes 3+     Jalen Cells 3+     Ovalocytes 2+    Comprehensive Metabolic Panel w/ Reflex to MG   Result Value Ref Range    Sodium 145 132 - 146 mmol/L    Potassium reflex Magnesium 3.9 3.5 - 5.0 mmol/L    Chloride 103 98 - 107 mmol/L    CO2 13 (L) 22 - 29 mmol/L    Anion Gap 29 (H) 7 - 16 mmol/L    Glucose 367 (H) 74 - 99 mg/dL    BUN 11 8 - 23 mg/dL    CREATININE 1.0 0.5 - 1.0 mg/dL    GFR Non-African American 55 >=60 mL/min/1.73    GFR African American >60     Calcium 7.8 (L) 8.6 - 10.2 mg/dL    Total Protein 3.6 (L) 6.4 - 8.3 g/dL    Alb 1.9 (L) 3.5 - 5.2 g/dL    Total Bilirubin <0.2 0.0 - 1.2 mg/dL    Alkaline Phosphatase 22 (L) 35 - 104 U/L     (H) 0 - 32 U/L     (H) 0 - 31 U/L   Troponin   Result Value Ref Range    Troponin 0.11 (H) 0.00 - 0.03 ng/mL   Brain Natriuretic Peptide   Result Value Ref Range    Pro-BNP 5,730 (H) 0 - 125 pg/mL   Protime-INR   Result Value Ref Range    Protime 11.7 9.3 - 12.4 sec    INR 1.0    APTT   Result Value Ref Range    aPTT 36.1 (H) 24.5 - 35.1 sec   Lactic Acid, Plasma   Result Value Ref Range    Lactic Acid 16.9 (HH) 0.5 - 2.2 mmol/L   Magnesium   Result Value Ref Range    Magnesium 4.6 (H) 1.6 - 2.6 mg/dL   Phosphorus   Result Value Ref Range    Phosphorus 8.0 (H) 2.5 - 4.5 mg/dL   Blood Gas, Arterial   Result Value Ref Range    Date Analyzed [id number]     Time Analyzed 3529     Source: Blood Arterial     pH, Blood Gas 7.129 (LL) 7.350 - 7.450    PCO2 24.5 (L) 35.0 - 45.0 mmHg    PO2 409.6 (H) 75.0 - 100.0 mmHg    HCO3 7.9 (L) 22.0 - 26.0 mmol/L    B.E. -19.6 (L) -3.0 - 3.0 mmol/L    O2 Sat 99.7 (H) 92.0 - 98.5 %    PO2/FIO2 4.10 mmHg/%    AaDO2 253.9 mmHg    RI(T) 0.62     O2Hb 98.4 (H) 94.0 - 97.0 %    COHb 0.3 0.0 - 1.5 %    MetHb 0.2 0.0 - 1.5 %    HHb 1.1 0.0 - 5.0 %    tHb (est) 9.7 (L) 11.5 - 16.5 g/dL    Mode AC     FIO2 100.0 %    Rr Mechanical 16.0 b/min    Vt Mechanical 450.0 mL    Peep/Cpap 5.0 cmH2O    Date Of Collection      Time Collected      Pt Temp 37.0 C     ID [id number]     Lab [id number]     Critical(s) Notified Handed report to Dr/RN    EKG 12 Lead   Result Value Ref Range    Ventricular Rate 30 BPM    Atrial Rate 91 BPM    QRS Duration 92 ms    Q-T Interval 506 ms    QTc Calculation (Bazett) 357 ms    P Axis 86 degrees    R Axis 2 degrees    T Axis 129 degrees       RADIOLOGY:  Interpreted by Radiologist.  XR CHEST PORTABLE   Final Result   1. The life support lines and tubes appear well positioned. 2. Enlarged cardiac silhouette, which may be in part or entirely due   to the magnification artifact from the AP portable technique. 3. Clear lungs. EKG:  This EKG is signed and interpreted by me. Complete heart block, inferior STEMI,   Interpreted by me      ------------------------- NURSING NOTES AND VITALS REVIEWED ---------------------------   The nursing notes within the ED encounter and vital signs as below have been reviewed by myself. BP (!) 66/35   Pulse (!) 44   Temp 97.3 °F (36.3 °C)   Resp 27   Wt 180 lb (81.6 kg)   LMP  (LMP Unknown)   SpO2 100%   BMI 32.92 kg/m²   Oxygen Saturation Interpretation: Abnormal    The patients available past medical records and past encounters were reviewed.         ------------------------------ ED COURSE/MEDICAL DECISION MAKING----------------------  Medications   EPINEPHrine 1 MG/10ML injection (1 mg Intravenous Given 7/15/20 1602)   amiodarone (CORDARONE) injection (150 mg Intravenous Given 7/15/20 1556)   lidocaine (cardiac) (XYLOCAINE) injection (100 mg Intravenous Given 7/15/20 1603)   sodium bicarbonate 8.4 % injection (50 mEq Intravenous Given 7/15/20 1603)   magnesium sulfate injection (2 g Intravenous Given 7/15/20 1603)   atropine injection (1 mg Intravenous Given 7/15/20 1604)   esmolol (BREVIBLOC) injection 50 mg (50 mg Intravenous Given 7/15/20 1600)     Procedures: Please see separate procedure note for arterial line and central line    Intubation Procedure Note    Indication: cardiac arrest    Consent: Unable to be obtained due to patient's condition. Medications Used: None    Procedure: The patient was placed in the appropriate position. Cricoid pressure was not required. Intubation was performed [facility] a 7.5 cuffed endotracheal tube. The cuff was then inflated and the tube was secured appropriately at a distance of 23 cm to the dental ridge. Initial confirmation of placement included bilateral breath sounds, an end tidal CO2 detector, absence of sounds over the stomach, tube fogging, adequate chest rise, adequate pulse oximetry reading and improved skin color. A chest x-ray to verify correct placement of the tube was not performed secondary to the patient having cardiac arrest.    The patient tolerated the procedure well. Complications: None              Medical Decision Making:   I, Dr. Rubina Rivera am the primary physician of record. Mor Sinclair is a 81 y.o. female who presents to the ED for dizziness. Patient subsequently underwent cardiac arrest.  Patient was in a V. fib arrest.  ACLS protocol was carried out and accordance with ACLS guidelines. Differential diagnosis includes but is not limited to STEMI, complete heart block, cardiogenic shock the patient does have a past medical history of   has a past medical history of Anxiety, CAD (coronary artery disease), Cancer ([address] CHB (complete heart block) ([address] Coronary artery disease involving native coronary artery of native heart without angina pectoris, Depression, GERD (gastroesophageal reflux disease), Hyperlipidemia, Hyperlipidemia LDL goal <70, and Hypertension. .  Please see code documentation. I did discuss the case with the patient's Sister Randall Grimaldo. Case was discussed extensively. Multiple family members were present and elected to make the patient DNR CCA.   Patient did  at 2019.      Re-Evaluations/Consultations:             ED Course as of Jul 16 1008   Wed Jul 15, 2020   1621 EKG be faxed to the Cath Lab at this time. Reveals inferior STEMI. [MT]   [phone] Dr. Ian Lee in the ED to evaluate the patient. Patient is currently being paced. [MT]   1726 Patient is in the bed being paced, patient is still hypotensive blood pressure is 66/22. I did speak with the patient's sister by Veronique Ulrich. She will be coming to the emergency department. [MT]   1806 Patient is in the bed reevaluated and has no change in condition. Pupils are fixed and dilated. [MT]   1806 Discussed the case with the patient's Sister Ana Galicia. She will be coming back to see the patient. [MT]   1953 Patient's family at bedside. At this time patient will be extubated per family request.     [MT]   2048 The case was discussed with Dr. Flakita Hoff  He states he has not seen the patient in many years. He will no sign the death certificate. [MT]      ED Course User Index  [MT] Kobe Cons, DO         Critical Care: Please note that the withdrawal or failure to initiate urgent interventions for this patient would likely result in a life threatening deterioration or permanent disability. Accordingly this patient received 60 minutes of critical care time, excluding separately billable procedures. This patient's ED course included: History, physical examination, reevaluation prior to disposition, labs, imaging, telemetry monitoring, EKG, multiple IV medications, critical care      This patient has remained hemodynamically stable during their ED course. Counseling: The emergency provider has spoken with the patient and discussed todays results, in addition to providing specific details for the plan of care and counseling regarding the diagnosis and prognosis.   Questions are answered at this time and they are agreeable with the plan.       --------------------------------- IMPRESSION AND DISPOSITION ---------------------------------    IMPRESSION  1. [facility] ([facility]    2. Ventricular fibrillation ([facility]    3. ST elevation myocardial infarction (STEMI), unspecified artery ([facility]    4. Complete heart block (H[facility])    5. Cardiogenic shock ([facility]        DISPOSITION  Disposition: Other Disposition:   Patient condition is         NOTE: This report was transcribed using voice recognition software.  Every effort was made to ensure accuracy; however, inadvertent computerized transcription errors may be present         Viky Leahy DO  20 1012

## 2020-07-15 NOTE — PROCEDURES
Central Line Insertion     Procedure: right femoral vein Triple Lumen Catheter placement. Indications: vascular access, poor peripheral access and centrally administered medications    Consent: Unable to be obtained due to the emergent nature of this procedure. Number of sticks: 2    Number of Kits used: 1    Procedure: Time Out: Immediately prior to the procedure a \"timeout\" was called to verify the correct patient and procedure. The patient was place in the trendelenburg position and the skin over the right femoral vein was prepped with betadine and draped in a sterile fashion. Local anesthesia was obtained by infiltration using 1% Lidocaine without epinephrine. With Ultrasound guidance a large bore needle was used to identify the vein, dark non pulsatile blood returned. The guide wire was then inserted through the needle with minimal resistance. 2 mm nick was made in the skin beside the guidewire. Then a dilator was inserted and removed. A triple lumen catheter was then inserted into the vessel over the guide wire using the Seldinger technique to the  20 cm robe. All ports showed good, free flowing blood return and were flushed with saline solution. The catheter was then securely fastened to the skin with sutures and covered with a bio patch and sterile dressing. A post procedure X-ray was ordered and showed good line position. Complications: None   The patient tolerated the procedure well. Estimated blood loss: 5 ml.     Basil Goltz, MD PGY-2  7/15/2020  5:28 PM      Attending: Dr Lew Ahumada

## 2020-07-16 LAB
EKG ATRIAL RATE: 91 BPM
EKG P AXIS: 86 DEGREES
EKG Q-T INTERVAL: 506 MS
EKG QRS DURATION: 92 MS
EKG QTC CALCULATION (BAZETT): 357 MS
EKG R AXIS: 2 DEGREES
EKG T AXIS: 129 DEGREES
EKG VENTRICULAR RATE: 30 BPM

## 2020-07-16 NOTE — ED NOTES
Dr. Jennifer More will not sign death certificate per Dr. Fely Dias. The  Johanna Morales ) was contacted. She was ok with placing pt in [facility] this evening.      [address], RN  07/15/20 0801

## 2020-07-16 NOTE — ED NOTES
Family wants pt to go to [facility] and [facility], [address] Meek Mukherjee, RN  07/15/20 2032

## 2020-07-16 NOTE — ED NOTES
Time of death 2019 Pt asystole on the monitor.  strip printed      Latasha Zendejas RN  07/15/20 2028

## 2020-07-16 NOTE — ED NOTES
[facility] contacted and spoke with Raúl Jewell.  Reference # [id number]     Amara Ann RN  07/15/20 212